# Patient Record
Sex: FEMALE | Race: WHITE | URBAN - METROPOLITAN AREA
[De-identification: names, ages, dates, MRNs, and addresses within clinical notes are randomized per-mention and may not be internally consistent; named-entity substitution may affect disease eponyms.]

---

## 2024-01-12 ENCOUNTER — OFFICE VISIT (OUTPATIENT)
Age: 34
End: 2024-01-12
Payer: COMMERCIAL

## 2024-01-12 VITALS
TEMPERATURE: 99.3 F | RESPIRATION RATE: 20 BRPM | HEIGHT: 63 IN | WEIGHT: 156.4 LBS | SYSTOLIC BLOOD PRESSURE: 104 MMHG | OXYGEN SATURATION: 98 % | DIASTOLIC BLOOD PRESSURE: 70 MMHG | HEART RATE: 95 BPM | BODY MASS INDEX: 27.71 KG/M2

## 2024-01-12 DIAGNOSIS — R51.9 BILATERAL HEADACHES: Primary | ICD-10-CM

## 2024-01-12 PROCEDURE — 99204 OFFICE O/P NEW MOD 45 MIN: CPT | Performed by: PSYCHIATRY & NEUROLOGY

## 2024-01-12 RX ORDER — LAMOTRIGINE 200 MG/1
1 TABLET ORAL
COMMUNITY
Start: 2023-02-21

## 2024-01-12 RX ORDER — ZIPRASIDONE HYDROCHLORIDE 20 MG/1
20 CAPSULE ORAL 2 TIMES DAILY WITH MEALS
COMMUNITY
Start: 2023-02-21

## 2024-01-12 RX ORDER — RIMEGEPANT SULFATE 75 MG/75MG
TABLET, ORALLY DISINTEGRATING ORAL
COMMUNITY

## 2024-01-12 NOTE — PROGRESS NOTES
Drug use: Not Currently     Types: Marijuana (Weed)    Sexual activity: Not on file   Other Topics Concern    Not on file   Social History Narrative    Not on file     Social Determinants of Health     Financial Resource Strain: Not on file   Food Insecurity: Not on file   Transportation Needs: Not on file   Physical Activity: Not on file   Stress: Not on file   Social Connections: Not on file   Intimate Partner Violence: Not on file   Housing Stability: Not on file        Tobacco:   Social History     Tobacco Use   Smoking Status Former    Types: Cigarettes    Passive exposure: Past   Smokeless Tobacco Never     Alcohol:   Social History     Substance and Sexual Activity   Alcohol Use Not Currently     Illicit drugs:   Social History     Substance and Sexual Activity   Drug Use Not Currently    Types: Marijuana (Weed)        Exposure to HIV: none    Neurological Review of Symptoms:       Bladder symptoms    Negative  Bowel symptoms    Negative  Pain      Negative  Numbness or tingling   Negative  Confusion     Negative  Memory Loss     Negative  Speech Disorder    Negative  Convulsions     2011 she had 4 sz related to drugs and alcohol but none after 2011.  Black-outs or syncope   Negative  Weakness     Negative  Muscle twitching or cramping  Negative  Headaches     As above  Vertigo or dizziness    Negative  Tinnitus     Negative  Hearing Loss     Negative  Imbalance or falling    Negative  Blurred vision    Negative  Visual loss     Negative  Double vision    Negative  Difficulty with gait or walking  Negative    General Review of Symptoms:       ENT     Negative  Eye     Negative  Cardiovascular   Negative  Gastrointestinal   Negative  Hematological/lymphatic  Negative  Respiratory    Negative  Allergic/immunological  Negative  Dermatologic/Breast   Negative  Musculoskeletal   Negative  Genito-urinary   Negative  Endocrine    Negative  Psychiatric    Negative  Constitutional   Negative      Objective:     Vital